# Patient Record
Sex: MALE | Race: WHITE | NOT HISPANIC OR LATINO | URBAN - METROPOLITAN AREA
[De-identification: names, ages, dates, MRNs, and addresses within clinical notes are randomized per-mention and may not be internally consistent; named-entity substitution may affect disease eponyms.]

---

## 2017-09-11 ENCOUNTER — EMERGENCY (EMERGENCY)
Facility: HOSPITAL | Age: 47
LOS: 1 days | Discharge: HOME | End: 2017-09-11
Attending: STUDENT IN AN ORGANIZED HEALTH CARE EDUCATION/TRAINING PROGRAM | Admitting: STUDENT IN AN ORGANIZED HEALTH CARE EDUCATION/TRAINING PROGRAM
Payer: COMMERCIAL

## 2017-09-11 VITALS
TEMPERATURE: 98 F | HEART RATE: 82 BPM | OXYGEN SATURATION: 99 % | SYSTOLIC BLOOD PRESSURE: 136 MMHG | DIASTOLIC BLOOD PRESSURE: 78 MMHG | HEIGHT: 72 IN | RESPIRATION RATE: 16 BRPM | WEIGHT: 184.97 LBS

## 2017-09-11 VITALS
RESPIRATION RATE: 17 BRPM | HEART RATE: 74 BPM | SYSTOLIC BLOOD PRESSURE: 132 MMHG | DIASTOLIC BLOOD PRESSURE: 74 MMHG | OXYGEN SATURATION: 97 %

## 2017-09-11 LAB
ALBUMIN SERPL ELPH-MCNC: 4 G/DL — SIGNIFICANT CHANGE UP (ref 3.3–5.2)
ALP SERPL-CCNC: 67 U/L — SIGNIFICANT CHANGE UP (ref 40–120)
ALT FLD-CCNC: 22 U/L — SIGNIFICANT CHANGE UP
ANION GAP SERPL CALC-SCNC: 15 MMOL/L — SIGNIFICANT CHANGE UP (ref 5–17)
AST SERPL-CCNC: 17 U/L — SIGNIFICANT CHANGE UP
BASOPHILS # BLD AUTO: 0 K/UL — SIGNIFICANT CHANGE UP (ref 0–0.2)
BASOPHILS NFR BLD AUTO: 0.3 % — SIGNIFICANT CHANGE UP (ref 0–2)
BILIRUB SERPL-MCNC: 0.2 MG/DL — LOW (ref 0.4–2)
BUN SERPL-MCNC: 23 MG/DL — HIGH (ref 8–20)
CALCIUM SERPL-MCNC: 9 MG/DL — SIGNIFICANT CHANGE UP (ref 8.6–10.2)
CHLORIDE SERPL-SCNC: 102 MMOL/L — SIGNIFICANT CHANGE UP (ref 98–107)
CK SERPL-CCNC: 83 U/L — SIGNIFICANT CHANGE UP (ref 30–200)
CO2 SERPL-SCNC: 23 MMOL/L — SIGNIFICANT CHANGE UP (ref 22–29)
CREAT SERPL-MCNC: 0.79 MG/DL — SIGNIFICANT CHANGE UP (ref 0.5–1.3)
D DIMER BLD IA.RAPID-MCNC: <150 NG/ML DDU — SIGNIFICANT CHANGE UP
EOSINOPHIL # BLD AUTO: 0.2 K/UL — SIGNIFICANT CHANGE UP (ref 0–0.5)
EOSINOPHIL NFR BLD AUTO: 2.3 % — SIGNIFICANT CHANGE UP (ref 0–6)
GLUCOSE SERPL-MCNC: 92 MG/DL — SIGNIFICANT CHANGE UP (ref 70–115)
HCT VFR BLD CALC: 42.6 % — SIGNIFICANT CHANGE UP (ref 42–52)
HGB BLD-MCNC: 14.4 G/DL — SIGNIFICANT CHANGE UP (ref 14–18)
LIDOCAIN IGE QN: 43 U/L — SIGNIFICANT CHANGE UP (ref 22–51)
LYMPHOCYTES # BLD AUTO: 3.3 K/UL — SIGNIFICANT CHANGE UP (ref 1–4.8)
LYMPHOCYTES # BLD AUTO: 30.9 % — SIGNIFICANT CHANGE UP (ref 20–55)
MCHC RBC-ENTMCNC: 30.8 PG — SIGNIFICANT CHANGE UP (ref 27–31)
MCHC RBC-ENTMCNC: 33.8 G/DL — SIGNIFICANT CHANGE UP (ref 32–36)
MCV RBC AUTO: 91.2 FL — SIGNIFICANT CHANGE UP (ref 80–94)
MONOCYTES # BLD AUTO: 1.2 K/UL — HIGH (ref 0–0.8)
MONOCYTES NFR BLD AUTO: 10.8 % — HIGH (ref 3–10)
NEUTROPHILS # BLD AUTO: 6 K/UL — SIGNIFICANT CHANGE UP (ref 1.8–8)
NEUTROPHILS NFR BLD AUTO: 55.5 % — SIGNIFICANT CHANGE UP (ref 37–73)
PLATELET # BLD AUTO: 184 K/UL — SIGNIFICANT CHANGE UP (ref 150–400)
POTASSIUM SERPL-MCNC: 4.2 MMOL/L — SIGNIFICANT CHANGE UP (ref 3.5–5.3)
POTASSIUM SERPL-SCNC: 4.2 MMOL/L — SIGNIFICANT CHANGE UP (ref 3.5–5.3)
PROT SERPL-MCNC: 6.8 G/DL — SIGNIFICANT CHANGE UP (ref 6.6–8.7)
RBC # BLD: 4.67 M/UL — SIGNIFICANT CHANGE UP (ref 4.6–6.2)
RBC # FLD: 13.8 % — SIGNIFICANT CHANGE UP (ref 11–15.6)
SODIUM SERPL-SCNC: 140 MMOL/L — SIGNIFICANT CHANGE UP (ref 135–145)
TROPONIN T SERPL-MCNC: <0.01 NG/ML — SIGNIFICANT CHANGE UP (ref 0–0.06)
WBC # BLD: 10.7 K/UL — SIGNIFICANT CHANGE UP (ref 4.8–10.8)
WBC # FLD AUTO: 10.7 K/UL — SIGNIFICANT CHANGE UP (ref 4.8–10.8)

## 2017-09-11 PROCEDURE — 83690 ASSAY OF LIPASE: CPT

## 2017-09-11 PROCEDURE — 99285 EMERGENCY DEPT VISIT HI MDM: CPT

## 2017-09-11 PROCEDURE — 71046 X-RAY EXAM CHEST 2 VIEWS: CPT

## 2017-09-11 PROCEDURE — 93971 EXTREMITY STUDY: CPT

## 2017-09-11 PROCEDURE — 93010 ELECTROCARDIOGRAM REPORT: CPT

## 2017-09-11 PROCEDURE — 71020: CPT | Mod: 26

## 2017-09-11 PROCEDURE — 82550 ASSAY OF CK (CPK): CPT

## 2017-09-11 PROCEDURE — 36415 COLL VENOUS BLD VENIPUNCTURE: CPT

## 2017-09-11 PROCEDURE — 99284 EMERGENCY DEPT VISIT MOD MDM: CPT | Mod: 25

## 2017-09-11 PROCEDURE — 84484 ASSAY OF TROPONIN QUANT: CPT

## 2017-09-11 PROCEDURE — 93971 EXTREMITY STUDY: CPT | Mod: 26,RT

## 2017-09-11 PROCEDURE — 85027 COMPLETE CBC AUTOMATED: CPT

## 2017-09-11 PROCEDURE — 80053 COMPREHEN METABOLIC PANEL: CPT

## 2017-09-11 PROCEDURE — 85379 FIBRIN DEGRADATION QUANT: CPT

## 2017-09-11 PROCEDURE — 93005 ELECTROCARDIOGRAM TRACING: CPT

## 2017-09-11 RX ORDER — SODIUM CHLORIDE 9 MG/ML
3 INJECTION INTRAMUSCULAR; INTRAVENOUS; SUBCUTANEOUS ONCE
Qty: 0 | Refills: 0 | Status: COMPLETED | OUTPATIENT
Start: 2017-09-11 | End: 2017-09-11

## 2017-09-11 RX ADMIN — SODIUM CHLORIDE 3 MILLILITER(S): 9 INJECTION INTRAMUSCULAR; INTRAVENOUS; SUBCUTANEOUS at 20:42

## 2017-09-11 NOTE — ED PROVIDER NOTE - PROGRESS NOTE DETAILS
Patient stable. Labs are as noted. Patient stable. Labs are as noted. Chest pain secondary to musculoskeletal strain during employment as . Patient strongly encouraged to follow-up as recommended by previous physicians for cardiac findings. Patient will try but states its exceedingly difficult due to his hours.

## 2017-09-11 NOTE — ED ADULT NURSE NOTE - PRO INTERPRETER NEED 2
Problem: Pain  Goal: #Acceptable pain/comfort level is achieved/maintained at rest (based on self report using Numeric Rating Scales/Faces  Outcome: Outcome Met, Continue evaluating goal progress toward completion  Acceptable with medication and positioning.       English

## 2017-09-11 NOTE — ED PROVIDER NOTE - OBJECTIVE STATEMENT
45 y/o M presents to ED c/o CP x 2 days. Associated Sx of R leg pain which now has radiated to back of leg. Pain exacerbated with movement. While laying down states chest feels tight. 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 Current smoker, half a pack a day. Took ASA to no relief. Denies syncope, back pain, constipation, N/V/D, fever, chills, SOB, difficulty breathing, HA, numbness, tingling and abd pain. 47 y/o M presents to ED c/o CP x 2 days worse with movement, certain position of the arm and lifting and without dyspnea. Associated Sx of Rt upper leg pain which now has radiated to back of leg. Pain exacerbated with movement. While laying down states chest feels tight. 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 Current smoker, half a pack a day. Took ASA to no relief. Denies syncope, back pain, constipation, N/V/D, fever, chills, SOB, difficulty breathing, HA, numbness, tingling and abd pain.

## 2021-02-11 ENCOUNTER — APPOINTMENT (OUTPATIENT)
Dept: DERMATOLOGY | Facility: CLINIC | Age: 51
End: 2021-02-11
Payer: COMMERCIAL

## 2021-02-11 PROCEDURE — 17110 DESTRUCTION B9 LES UP TO 14: CPT

## 2021-02-11 PROCEDURE — 99072 ADDL SUPL MATRL&STAF TM PHE: CPT

## 2021-02-11 PROCEDURE — 99202 OFFICE O/P NEW SF 15 MIN: CPT | Mod: 25

## 2021-11-09 ENCOUNTER — EMERGENCY (EMERGENCY)
Facility: HOSPITAL | Age: 51
LOS: 1 days | Discharge: HOME | End: 2021-11-09
Attending: EMERGENCY MEDICINE
Payer: COMMERCIAL

## 2021-11-09 VITALS
TEMPERATURE: 99 F | HEIGHT: 72 IN | OXYGEN SATURATION: 99 % | WEIGHT: 205.03 LBS | HEART RATE: 76 BPM | RESPIRATION RATE: 18 BRPM | SYSTOLIC BLOOD PRESSURE: 189 MMHG | DIASTOLIC BLOOD PRESSURE: 106 MMHG

## 2021-11-09 VITALS — SYSTOLIC BLOOD PRESSURE: 174 MMHG | DIASTOLIC BLOOD PRESSURE: 94 MMHG

## 2021-11-09 LAB
ALBUMIN SERPL ELPH-MCNC: 4.5 G/DL — SIGNIFICANT CHANGE UP (ref 3.3–5.2)
ALP SERPL-CCNC: 74 U/L — SIGNIFICANT CHANGE UP (ref 40–120)
ALT FLD-CCNC: 31 U/L — SIGNIFICANT CHANGE UP
ANION GAP SERPL CALC-SCNC: 12 MMOL/L — SIGNIFICANT CHANGE UP (ref 5–17)
AST SERPL-CCNC: 24 U/L — SIGNIFICANT CHANGE UP
BASOPHILS # BLD AUTO: 0.06 K/UL — SIGNIFICANT CHANGE UP (ref 0–0.2)
BASOPHILS NFR BLD AUTO: 0.6 % — SIGNIFICANT CHANGE UP (ref 0–2)
BILIRUB SERPL-MCNC: 0.3 MG/DL — LOW (ref 0.4–2)
BUN SERPL-MCNC: 16.2 MG/DL — SIGNIFICANT CHANGE UP (ref 8–20)
CALCIUM SERPL-MCNC: 9.4 MG/DL — SIGNIFICANT CHANGE UP (ref 8.6–10.2)
CHLORIDE SERPL-SCNC: 101 MMOL/L — SIGNIFICANT CHANGE UP (ref 98–107)
CO2 SERPL-SCNC: 27 MMOL/L — SIGNIFICANT CHANGE UP (ref 22–29)
CREAT SERPL-MCNC: 0.8 MG/DL — SIGNIFICANT CHANGE UP (ref 0.5–1.3)
EOSINOPHIL # BLD AUTO: 0.01 K/UL — SIGNIFICANT CHANGE UP (ref 0–0.5)
EOSINOPHIL NFR BLD AUTO: 0.1 % — SIGNIFICANT CHANGE UP (ref 0–6)
GLUCOSE SERPL-MCNC: 88 MG/DL — SIGNIFICANT CHANGE UP (ref 70–99)
HCT VFR BLD CALC: 45.4 % — SIGNIFICANT CHANGE UP (ref 39–50)
HGB BLD-MCNC: 15.7 G/DL — SIGNIFICANT CHANGE UP (ref 13–17)
IMM GRANULOCYTES NFR BLD AUTO: 0.5 % — SIGNIFICANT CHANGE UP (ref 0–1.5)
LYMPHOCYTES # BLD AUTO: 3.37 K/UL — HIGH (ref 1–3.3)
LYMPHOCYTES # BLD AUTO: 33.4 % — SIGNIFICANT CHANGE UP (ref 13–44)
MAGNESIUM SERPL-MCNC: 2.1 MG/DL — SIGNIFICANT CHANGE UP (ref 1.6–2.6)
MCHC RBC-ENTMCNC: 31.3 PG — SIGNIFICANT CHANGE UP (ref 27–34)
MCHC RBC-ENTMCNC: 34.6 GM/DL — SIGNIFICANT CHANGE UP (ref 32–36)
MCV RBC AUTO: 90.6 FL — SIGNIFICANT CHANGE UP (ref 80–100)
MONOCYTES # BLD AUTO: 0.92 K/UL — HIGH (ref 0–0.9)
MONOCYTES NFR BLD AUTO: 9.1 % — SIGNIFICANT CHANGE UP (ref 2–14)
NEUTROPHILS # BLD AUTO: 5.67 K/UL — SIGNIFICANT CHANGE UP (ref 1.8–7.4)
NEUTROPHILS NFR BLD AUTO: 56.3 % — SIGNIFICANT CHANGE UP (ref 43–77)
NT-PROBNP SERPL-SCNC: 63 PG/ML — SIGNIFICANT CHANGE UP (ref 0–300)
PLATELET # BLD AUTO: 212 K/UL — SIGNIFICANT CHANGE UP (ref 150–400)
POTASSIUM SERPL-MCNC: 4.1 MMOL/L — SIGNIFICANT CHANGE UP (ref 3.5–5.3)
POTASSIUM SERPL-SCNC: 4.1 MMOL/L — SIGNIFICANT CHANGE UP (ref 3.5–5.3)
PROT SERPL-MCNC: 7.5 G/DL — SIGNIFICANT CHANGE UP (ref 6.6–8.7)
RBC # BLD: 5.01 M/UL — SIGNIFICANT CHANGE UP (ref 4.2–5.8)
RBC # FLD: 12.7 % — SIGNIFICANT CHANGE UP (ref 10.3–14.5)
SODIUM SERPL-SCNC: 140 MMOL/L — SIGNIFICANT CHANGE UP (ref 135–145)
TROPONIN T SERPL-MCNC: <0.01 NG/ML — SIGNIFICANT CHANGE UP (ref 0–0.06)
WBC # BLD: 10.08 K/UL — SIGNIFICANT CHANGE UP (ref 3.8–10.5)
WBC # FLD AUTO: 10.08 K/UL — SIGNIFICANT CHANGE UP (ref 3.8–10.5)

## 2021-11-09 PROCEDURE — 99284 EMERGENCY DEPT VISIT MOD MDM: CPT | Mod: 25

## 2021-11-09 PROCEDURE — 83880 ASSAY OF NATRIURETIC PEPTIDE: CPT

## 2021-11-09 PROCEDURE — 80053 COMPREHEN METABOLIC PANEL: CPT

## 2021-11-09 PROCEDURE — 71045 X-RAY EXAM CHEST 1 VIEW: CPT | Mod: 26

## 2021-11-09 PROCEDURE — G1004: CPT

## 2021-11-09 PROCEDURE — 85025 COMPLETE CBC W/AUTO DIFF WBC: CPT

## 2021-11-09 PROCEDURE — 93010 ELECTROCARDIOGRAM REPORT: CPT

## 2021-11-09 PROCEDURE — 70450 CT HEAD/BRAIN W/O DYE: CPT | Mod: ME

## 2021-11-09 PROCEDURE — 71045 X-RAY EXAM CHEST 1 VIEW: CPT

## 2021-11-09 PROCEDURE — 99285 EMERGENCY DEPT VISIT HI MDM: CPT

## 2021-11-09 PROCEDURE — 70450 CT HEAD/BRAIN W/O DYE: CPT | Mod: 26,ME

## 2021-11-09 PROCEDURE — 93005 ELECTROCARDIOGRAM TRACING: CPT

## 2021-11-09 PROCEDURE — 83735 ASSAY OF MAGNESIUM: CPT

## 2021-11-09 PROCEDURE — 84484 ASSAY OF TROPONIN QUANT: CPT

## 2021-11-09 PROCEDURE — 36415 COLL VENOUS BLD VENIPUNCTURE: CPT

## 2021-11-09 RX ORDER — AMLODIPINE BESYLATE 2.5 MG/1
1 TABLET ORAL
Qty: 14 | Refills: 0
Start: 2021-11-09 | End: 2021-11-22

## 2021-11-09 RX ORDER — AMLODIPINE BESYLATE 2.5 MG/1
5 TABLET ORAL ONCE
Refills: 0 | Status: COMPLETED | OUTPATIENT
Start: 2021-11-09 | End: 2021-11-09

## 2021-11-09 RX ADMIN — AMLODIPINE BESYLATE 5 MILLIGRAM(S): 2.5 TABLET ORAL at 19:31

## 2021-11-09 NOTE — ED PROVIDER NOTE - PROGRESS NOTE DETAILS
Clementine CAMACHOS: pt comfortable in bed. NAD. blood pressure repeated now 174/94 after 5mg Amlodipine. denies any pain or discomfort. : patient report feeling better. BP improved. Outpatient follow up recommended.

## 2021-11-09 NOTE — ED ADULT TRIAGE NOTE - CHIEF COMPLAINT QUOTE
went to urgent care for weakness and pain to hands and legs, Hypertensive 182/110 in office. denies hx htn. pt denies HA, blurred vision

## 2021-11-09 NOTE — ED PROVIDER NOTE - OBJECTIVE STATEMENT
49yo male pt with pmhx of uncontrolled HLD and negative cardiac cath (2015) presents to ED from [facility] for increased fatigue and weakness in upper and lower extremities b/l where found to have high blood pressure 189/106. pt states feel starting a couple days ago, just owed it to long hours at work but last 2 days woke up with an "achey" feeling and weakness stating "it was a struggle to just hold the coffee cup and steering wheel this morning." pt denies this feeling before. pt also endorses increased SOB, stating starting to bother him at rest, noticed this week. pt also states noticed palpitations this week, lasting a couple seconds not associated with chest pain or shortness of breath. pt denies fever, n/v, headache, dizziness, blurred vision, chest pain, cough, abdominal pain, trouble with bowel movements or dysuria.

## 2021-11-09 NOTE — ED ADULT NURSE NOTE - OBJECTIVE STATEMENT
received pt from work pt states my legs felt weak. denies angina sob palpitations. skin dry warm res peven unlabored. parag murphy

## 2021-11-09 NOTE — ED PROVIDER NOTE - CLINICAL SUMMARY MEDICAL DECISION MAKING FREE TEXT BOX
49 yo pt, in NAD, comfortable in bed with no current complaints states he has been feeling onset of numbness and weakness in upper and lower extremities for the last couple days. blood pressure 189/106 on arrival, states never had high blood pressure before. no blurry vision, no headache, no abdominal pain, no dizziness.   ordered labs, ekg, imaging and meds.

## 2021-11-09 NOTE — ED PROVIDER NOTE - PATIENT PORTAL LINK FT
You can access the FollowMyHealth Patient Portal offered by [facility] by registering at the following website: [email or web address]. By joining [employer]’s FollowMyHealth portal, you will also be able to view your health information using other applications (apps) compatible with our system.

## 2021-11-09 NOTE — ED PROVIDER NOTE - CARE PROVIDER_API CALL
Joel Burns)  Cardiovascular Disease  [address] [facility], [address] [place] [address]  Phone: [phone]  Fax: [phone]  Follow Up Time: 7-10 Days

## 2021-11-09 NOTE — ED PROVIDER NOTE - ATTENDING CONTRIBUTION TO CARE
I, Geoff Mg, have personally performed the HPI, physical exam and medical decision making and was personally present and verified all student documentation or findings including history, physical exam, and medical decision making except as noted.    51 yo M hx HDL send in from urgent care for HTN. patient report feeling weak in the hands and legs with tingling for 3 days. patient was found hypertensive at urgent care. patient report occasoinal chest pressure. Currently no weakness. no cough or congestion. no fever. EKG without ischemic changes. trop negative. cxr clear. CT head negative. no focal neurological deficit. patient given norvasc 5 mg Po with improvement. patient comfortable going home.

## 2021-11-13 ENCOUNTER — EMERGENCY (EMERGENCY)
Facility: HOSPITAL | Age: 51
LOS: 1 days | Discharge: HOME | End: 2021-11-13
Attending: EMERGENCY MEDICINE
Payer: COMMERCIAL

## 2021-11-13 VITALS
SYSTOLIC BLOOD PRESSURE: 176 MMHG | TEMPERATURE: 98 F | RESPIRATION RATE: 18 BRPM | OXYGEN SATURATION: 96 % | HEART RATE: 76 BPM | HEIGHT: 72 IN | WEIGHT: 205.03 LBS | DIASTOLIC BLOOD PRESSURE: 109 MMHG

## 2021-11-13 VITALS
OXYGEN SATURATION: 99 % | DIASTOLIC BLOOD PRESSURE: 85 MMHG | TEMPERATURE: 99 F | HEART RATE: 69 BPM | SYSTOLIC BLOOD PRESSURE: 142 MMHG | RESPIRATION RATE: 16 BRPM

## 2021-11-13 LAB
ALBUMIN SERPL ELPH-MCNC: 4.4 G/DL — SIGNIFICANT CHANGE UP (ref 3.3–5.2)
ALP SERPL-CCNC: 80 U/L — SIGNIFICANT CHANGE UP (ref 40–120)
ALT FLD-CCNC: 29 U/L — SIGNIFICANT CHANGE UP
ANION GAP SERPL CALC-SCNC: 14 MMOL/L — SIGNIFICANT CHANGE UP (ref 5–17)
AST SERPL-CCNC: 18 U/L — SIGNIFICANT CHANGE UP
BASOPHILS # BLD AUTO: 0.07 K/UL — SIGNIFICANT CHANGE UP (ref 0–0.2)
BASOPHILS NFR BLD AUTO: 0.6 % — SIGNIFICANT CHANGE UP (ref 0–2)
BILIRUB SERPL-MCNC: 0.4 MG/DL — SIGNIFICANT CHANGE UP (ref 0.4–2)
BLD GP AB SCN SERPL QL: SIGNIFICANT CHANGE UP
BUN SERPL-MCNC: 15.6 MG/DL — SIGNIFICANT CHANGE UP (ref 8–20)
CALCIUM SERPL-MCNC: 9 MG/DL — SIGNIFICANT CHANGE UP (ref 8.6–10.2)
CHLORIDE SERPL-SCNC: 100 MMOL/L — SIGNIFICANT CHANGE UP (ref 98–107)
CO2 SERPL-SCNC: 23 MMOL/L — SIGNIFICANT CHANGE UP (ref 22–29)
CREAT SERPL-MCNC: 0.64 MG/DL — SIGNIFICANT CHANGE UP (ref 0.5–1.3)
CRP SERPL-MCNC: 5 MG/L — HIGH
EOSINOPHIL # BLD AUTO: 0 K/UL — SIGNIFICANT CHANGE UP (ref 0–0.5)
EOSINOPHIL NFR BLD AUTO: 0 % — SIGNIFICANT CHANGE UP (ref 0–6)
ERYTHROCYTE [SEDIMENTATION RATE] IN BLOOD: 10 MM/HR — SIGNIFICANT CHANGE UP (ref 0–20)
GLUCOSE SERPL-MCNC: 96 MG/DL — SIGNIFICANT CHANGE UP (ref 70–99)
HCT VFR BLD CALC: 48.4 % — SIGNIFICANT CHANGE UP (ref 39–50)
HGB BLD-MCNC: 16.2 G/DL — SIGNIFICANT CHANGE UP (ref 13–17)
HIV 1 & 2 AB SERPL IA.RAPID: SIGNIFICANT CHANGE UP
IMM GRANULOCYTES NFR BLD AUTO: 0.6 % — SIGNIFICANT CHANGE UP (ref 0–1.5)
LYMPHOCYTES # BLD AUTO: 19.6 % — SIGNIFICANT CHANGE UP (ref 13–44)
LYMPHOCYTES # BLD AUTO: 2.46 K/UL — SIGNIFICANT CHANGE UP (ref 1–3.3)
MCHC RBC-ENTMCNC: 30.9 PG — SIGNIFICANT CHANGE UP (ref 27–34)
MCHC RBC-ENTMCNC: 33.5 GM/DL — SIGNIFICANT CHANGE UP (ref 32–36)
MCV RBC AUTO: 92.4 FL — SIGNIFICANT CHANGE UP (ref 80–100)
MONOCYTES # BLD AUTO: 1.36 K/UL — HIGH (ref 0–0.9)
MONOCYTES NFR BLD AUTO: 10.8 % — SIGNIFICANT CHANGE UP (ref 2–14)
NEUTROPHILS # BLD AUTO: 8.6 K/UL — HIGH (ref 1.8–7.4)
NEUTROPHILS NFR BLD AUTO: 68.4 % — SIGNIFICANT CHANGE UP (ref 43–77)
PLATELET # BLD AUTO: 212 K/UL — SIGNIFICANT CHANGE UP (ref 150–400)
POTASSIUM SERPL-MCNC: 4.1 MMOL/L — SIGNIFICANT CHANGE UP (ref 3.5–5.3)
POTASSIUM SERPL-SCNC: 4.1 MMOL/L — SIGNIFICANT CHANGE UP (ref 3.5–5.3)
PROT SERPL-MCNC: 7.3 G/DL — SIGNIFICANT CHANGE UP (ref 6.6–8.7)
RBC # BLD: 5.24 M/UL — SIGNIFICANT CHANGE UP (ref 4.2–5.8)
RBC # FLD: 12.5 % — SIGNIFICANT CHANGE UP (ref 10.3–14.5)
SODIUM SERPL-SCNC: 137 MMOL/L — SIGNIFICANT CHANGE UP (ref 135–145)
TROPONIN T SERPL-MCNC: <0.01 NG/ML — SIGNIFICANT CHANGE UP (ref 0–0.06)
URATE SERPL-MCNC: 3.3 MG/DL — LOW (ref 3.4–7)
WBC # BLD: 12.57 K/UL — HIGH (ref 3.8–10.5)
WBC # FLD AUTO: 12.57 K/UL — HIGH (ref 3.8–10.5)

## 2021-11-13 PROCEDURE — 86850 RBC ANTIBODY SCREEN: CPT

## 2021-11-13 PROCEDURE — 87591 N.GONORRHOEAE DNA AMP PROB: CPT

## 2021-11-13 PROCEDURE — 99284 EMERGENCY DEPT VISIT MOD MDM: CPT

## 2021-11-13 PROCEDURE — 86431 RHEUMATOID FACTOR QUANT: CPT

## 2021-11-13 PROCEDURE — 80053 COMPREHEN METABOLIC PANEL: CPT

## 2021-11-13 PROCEDURE — 84550 ASSAY OF BLOOD/URIC ACID: CPT

## 2021-11-13 PROCEDURE — 73110 X-RAY EXAM OF WRIST: CPT

## 2021-11-13 PROCEDURE — 96375 TX/PRO/DX INJ NEW DRUG ADDON: CPT

## 2021-11-13 PROCEDURE — 71045 X-RAY EXAM CHEST 1 VIEW: CPT

## 2021-11-13 PROCEDURE — 86703 HIV-1/HIV-2 1 RESULT ANTBDY: CPT

## 2021-11-13 PROCEDURE — 85652 RBC SED RATE AUTOMATED: CPT

## 2021-11-13 PROCEDURE — 84484 ASSAY OF TROPONIN QUANT: CPT

## 2021-11-13 PROCEDURE — 73110 X-RAY EXAM OF WRIST: CPT | Mod: 26,LT

## 2021-11-13 PROCEDURE — 85025 COMPLETE CBC W/AUTO DIFF WBC: CPT

## 2021-11-13 PROCEDURE — 86900 BLOOD TYPING SEROLOGIC ABO: CPT

## 2021-11-13 PROCEDURE — 86140 C-REACTIVE PROTEIN: CPT

## 2021-11-13 PROCEDURE — 96374 THER/PROPH/DIAG INJ IV PUSH: CPT

## 2021-11-13 PROCEDURE — 36415 COLL VENOUS BLD VENIPUNCTURE: CPT

## 2021-11-13 PROCEDURE — 86901 BLOOD TYPING SEROLOGIC RH(D): CPT

## 2021-11-13 PROCEDURE — 71045 X-RAY EXAM CHEST 1 VIEW: CPT | Mod: 26

## 2021-11-13 PROCEDURE — 99284 EMERGENCY DEPT VISIT MOD MDM: CPT | Mod: 25

## 2021-11-13 RX ORDER — AMLODIPINE BESYLATE 2.5 MG/1
5 TABLET ORAL ONCE
Refills: 0 | Status: COMPLETED | OUTPATIENT
Start: 2021-11-13 | End: 2021-11-13

## 2021-11-13 RX ORDER — OXYCODONE AND ACETAMINOPHEN 5; 325 MG/1; MG/1
2 TABLET ORAL ONCE
Refills: 0 | Status: DISCONTINUED | OUTPATIENT
Start: 2021-11-13 | End: 2021-11-13

## 2021-11-13 RX ORDER — LABETALOL HCL 100 MG
10 TABLET ORAL ONCE
Refills: 0 | Status: DISCONTINUED | OUTPATIENT
Start: 2021-11-13 | End: 2021-11-13

## 2021-11-13 RX ORDER — DEXAMETHASONE 0.5 MG/5ML
10 ELIXIR ORAL ONCE
Refills: 0 | Status: COMPLETED | OUTPATIENT
Start: 2021-11-13 | End: 2021-11-13

## 2021-11-13 RX ORDER — OXYCODONE HYDROCHLORIDE 5 MG/1
1 TABLET ORAL
Qty: 9 | Refills: 0
Start: 2021-11-13 | End: 2021-11-15

## 2021-11-13 RX ORDER — KETOROLAC TROMETHAMINE 30 MG/ML
15 SYRINGE (ML) INJECTION ONCE
Refills: 0 | Status: DISCONTINUED | OUTPATIENT
Start: 2021-11-13 | End: 2021-11-13

## 2021-11-13 RX ADMIN — AMLODIPINE BESYLATE 5 MILLIGRAM(S): 2.5 TABLET ORAL at 09:14

## 2021-11-13 RX ADMIN — Medication 102 MILLIGRAM(S): at 09:14

## 2021-11-13 RX ADMIN — Medication 15 MILLIGRAM(S): at 12:48

## 2021-11-13 RX ADMIN — OXYCODONE AND ACETAMINOPHEN 2 TABLET(S): 5; 325 TABLET ORAL at 12:48

## 2021-11-13 NOTE — ED PROVIDER NOTE - PATIENT PORTAL LINK FT
You can access the FollowMyHealth Patient Portal offered by [facility] by registering at the following website: [email or web address]. By joining [employer]’s FollowMyHealth portal, you will also be able to view your health information using other applications (apps) compatible with our system.

## 2021-11-13 NOTE — ED PROVIDER NOTE - CLINICAL SUMMARY MEDICAL DECISION MAKING FREE TEXT BOX
The patient presents with left wrist pain and most likely has inflammatory arthritis and will give short term steroid and NSAIDS and follow up with Ortho clinic

## 2021-11-13 NOTE — ED ADULT TRIAGE NOTE - HEIGHT IN CM
+Clear rhinorrhea, no nasal flaring, no suprasternal and no intercostal retractions. No respiratory distress. Well, nontoxic appearing.  Bilateral maxillary sinus discomfort. 182.88

## 2021-11-13 NOTE — ED PROVIDER NOTE - OBJECTIVE STATEMENT
The patient is a 50 year old male presents with L wrist pain for 3 days. No trauma, No fever, No rashes, No insect bites. No history of STD and No history of Gout  No CP, No SOB, No abd pain, No motor No sensory loss  RHD and owns a [employer]

## 2021-11-13 NOTE — ED ADULT TRIAGE NOTE - CHIEF COMPLAINT QUOTE
patient came to ER for left wrist pain and swelling denies injury, patient also was here 3 days ago with HTN medication started still high denies N/V/SOB/CP/HA

## 2021-11-13 NOTE — ED PROVIDER NOTE - PROGRESS NOTE DETAILS
The patient has left wrist pain without fever, no redness and still has ROM and doubt septic joint.  Uric acid is normal and will place pain in NSAIDS and splint and follow up

## 2021-11-13 NOTE — ED PROVIDER NOTE - ATTENDING CONTRIBUTION TO CARE
The patient seen and examined    L wrist pain  Most likely inflammatory arthritis    I, Ady Hood, performed the initial face to face bedside interview with this patient regarding history of present illness, review of symptoms and relevant past medical, social and family history.  I completed an independent physical examination.  I was the initial provider who evaluated this patient. I have signed out the follow up of any pending tests (i.e. labs, radiological studies) to the resident.  I have communicated the patient’s plan of care and disposition with the resident.

## 2021-11-14 LAB — RHEUMATOID FACT SERPL-ACNC: <10 IU/ML — SIGNIFICANT CHANGE UP (ref 0–13)

## 2021-11-15 LAB
N GONORRHOEA RRNA SPEC QL NAA+PROBE: SIGNIFICANT CHANGE UP
SPECIMEN SOURCE: SIGNIFICANT CHANGE UP